# Patient Record
Sex: FEMALE | Race: BLACK OR AFRICAN AMERICAN | URBAN - METROPOLITAN AREA
[De-identification: names, ages, dates, MRNs, and addresses within clinical notes are randomized per-mention and may not be internally consistent; named-entity substitution may affect disease eponyms.]

---

## 2021-12-05 ENCOUNTER — CLAIMS CREATED FROM THE CLAIM WINDOW (OUTPATIENT)
Dept: URBAN - METROPOLITAN AREA MEDICAL CENTER 43 | Facility: MEDICAL CENTER | Age: 66
End: 2021-12-05
Payer: COMMERCIAL

## 2021-12-05 DIAGNOSIS — R74.8 ABNORMAL LEVELS OF OTHER SERUM ENZYMES: ICD-10-CM

## 2021-12-05 DIAGNOSIS — R79.89 ABNORMAL LIVER FUNCTION TESTS: ICD-10-CM

## 2021-12-05 DIAGNOSIS — C85.90 LYMPHOMA: ICD-10-CM

## 2021-12-05 DIAGNOSIS — R76.8 OTHER SPECIFIED ABNORMAL IMMUNOLOGICAL FINDINGS IN SERUM: ICD-10-CM

## 2021-12-05 DIAGNOSIS — R74.01 ELEVATION OF LEVELS OF LIVER TRANSAMINASE LEVELS: ICD-10-CM

## 2021-12-05 PROCEDURE — 99222 1ST HOSP IP/OBS MODERATE 55: CPT | Performed by: INTERNAL MEDICINE

## 2021-12-06 ENCOUNTER — CLAIMS CREATED FROM THE CLAIM WINDOW (OUTPATIENT)
Dept: URBAN - METROPOLITAN AREA MEDICAL CENTER 43 | Facility: MEDICAL CENTER | Age: 66
End: 2021-12-06
Payer: COMMERCIAL

## 2021-12-06 DIAGNOSIS — C85.90 NON-HODGKIN LYMPHOMA, UNSPECIFIED, UNSPECIFIED SITE: ICD-10-CM

## 2021-12-06 DIAGNOSIS — R74.01 ABNORMAL/ELEVATED TRANSAMINASE (SGOT, AMINOTRANSFERASE): ICD-10-CM

## 2021-12-06 DIAGNOSIS — R79.89 ABNORMAL C-REACTIVE PROTEIN: ICD-10-CM

## 2021-12-06 DIAGNOSIS — R74.8 ABNORMAL LEVELS OF OTHER SERUM ENZYMES: ICD-10-CM

## 2021-12-06 DIAGNOSIS — R76.8 AUTOANTIBODY TITER POSITIVE: ICD-10-CM

## 2021-12-06 PROCEDURE — 99233 SBSQ HOSP IP/OBS HIGH 50: CPT | Performed by: PHYSICIAN ASSISTANT

## 2021-12-07 ENCOUNTER — CLAIMS CREATED FROM THE CLAIM WINDOW (OUTPATIENT)
Dept: URBAN - METROPOLITAN AREA MEDICAL CENTER 43 | Facility: MEDICAL CENTER | Age: 66
End: 2021-12-07
Payer: COMMERCIAL

## 2021-12-07 DIAGNOSIS — R74.01 ELEVATED ALANINE AMINOTRANSFERASE (ALT) LEVEL: ICD-10-CM

## 2021-12-07 DIAGNOSIS — R74.8 ABNORMAL LEVELS OF OTHER SERUM ENZYMES: ICD-10-CM

## 2021-12-07 DIAGNOSIS — R79.89 ABNORMAL C-REACTIVE PROTEIN: ICD-10-CM

## 2021-12-07 DIAGNOSIS — C85.90 NON-HODGKIN LYMPHOMA, UNSPECIFIED, UNSPECIFIED SITE: ICD-10-CM

## 2021-12-07 DIAGNOSIS — R76.8 AUTOANTIBODY TITER ELEVATED: ICD-10-CM

## 2021-12-07 PROCEDURE — 99233 SBSQ HOSP IP/OBS HIGH 50: CPT | Performed by: PHYSICIAN ASSISTANT

## 2021-12-09 ENCOUNTER — TELEPHONE ENCOUNTER (OUTPATIENT)
Dept: URBAN - METROPOLITAN AREA CLINIC 113 | Facility: CLINIC | Age: 66
End: 2021-12-09

## 2021-12-14 LAB
A/G RATIO: 1.4
ALBUMIN: 3.4
ALKALINE PHOSPHATASE: 214
ALT (SGPT): 288
AST (SGOT): 463
BASO (ABSOLUTE): 0.1
BASOS: 1
BILIRUBIN, TOTAL: 24
BUN/CREATININE RATIO: 14
BUN: 18
CALCIUM: 9.8
CARBON DIOXIDE, TOTAL: 21
CHLORIDE: 95
CREATININE: 1.33
EGFR IF AFRICN AM: 48
EGFR IF NONAFRICN AM: 42
EOS (ABSOLUTE): 0
EOS: 0
GLOBULIN, TOTAL: 2.4
GLUCOSE: 93
HEMATOCRIT: 28.5
HEMATOLOGY COMMENTS:: (no result)
HEMOGLOBIN: 10.1
IMMATURE CELLS: (no result)
IMMATURE GRANS (ABS): 0.4
IMMATURE GRANULOCYTES: 4
INR: 2
LYMPHS (ABSOLUTE): 0.6
LYMPHS: 7
MCH: 27
MCHC: 35.4
MCV: 76
MONOCYTES(ABSOLUTE): 1.1
MONOCYTES: 12
NEUTROPHILS (ABSOLUTE): 7.2
NEUTROPHILS: 76
NRBC: (no result)
PLATELETS: 105
POTASSIUM: 4.6
PROTEIN, TOTAL: 5.8
PROTHROMBIN TIME: 20.2
RBC: 3.74
RDW: 17.5
SODIUM: 136
WBC: 9.5

## 2021-12-15 ENCOUNTER — TELEPHONE ENCOUNTER (OUTPATIENT)
Dept: URBAN - METROPOLITAN AREA CLINIC 113 | Facility: CLINIC | Age: 66
End: 2021-12-15

## 2021-12-17 ENCOUNTER — WEB ENCOUNTER (OUTPATIENT)
Dept: URBAN - METROPOLITAN AREA CLINIC 113 | Facility: CLINIC | Age: 66
End: 2021-12-17

## 2021-12-17 ENCOUNTER — OFFICE VISIT (OUTPATIENT)
Dept: URBAN - METROPOLITAN AREA CLINIC 113 | Facility: CLINIC | Age: 66
End: 2021-12-17
Payer: COMMERCIAL

## 2021-12-17 VITALS
HEIGHT: 60 IN | WEIGHT: 180 LBS | BODY MASS INDEX: 35.34 KG/M2 | TEMPERATURE: 97.8 F | DIASTOLIC BLOOD PRESSURE: 68 MMHG | HEART RATE: 103 BPM | SYSTOLIC BLOOD PRESSURE: 113 MMHG

## 2021-12-17 DIAGNOSIS — R74.8 ELEVATED LIVER ENZYMES: ICD-10-CM

## 2021-12-17 DIAGNOSIS — D68.9 COAGULOPATHY: ICD-10-CM

## 2021-12-17 DIAGNOSIS — E80.6 HYPERBILIRUBINEMIA: ICD-10-CM

## 2021-12-17 DIAGNOSIS — B16.9 ACUTE VIRAL HEPATITIS B WITHOUT COMA AND WITHOUT DELTA AGENT: ICD-10-CM

## 2021-12-17 PROCEDURE — 99214 OFFICE O/P EST MOD 30 MIN: CPT | Performed by: NURSE PRACTITIONER

## 2021-12-17 RX ORDER — POTASSIUM CHLORIDE 750 MG/1
1 TABLET WITH FOOD TABLET, EXTENDED RELEASE ORAL TWICE A DAY
Status: ACTIVE | COMMUNITY

## 2021-12-17 RX ORDER — FUROSEMIDE 20 MG/1
1 TABLET TABLET ORAL ONCE A DAY
Status: ACTIVE | COMMUNITY

## 2021-12-17 NOTE — PHYSICAL EXAM GASTROINTESTINAL
Abdomen , soft, nontender, nondistended , no guarding or rigidity , no masses palpable , normal bowel sounds , Liver and Spleen , no hepatosplenomegaly; exam limited due to patient position

## 2021-12-17 NOTE — HPI-TODAY'S VISIT:
This is a 66-year-old female with a history of metastatic T-cell non-Hodgkin's lymphoma, anemia, hypertension, obesity, and elevated liver enzymes presenting for hospital follow-up. She was seen in hospital consultation 12/5/2021 for liver enzyme elevation.  She was followed by Dr. Patino for non-Hodgkin's lymphoma and had undergone 6 cycles of CHOP chemotherapy.  She was noted to have liver involvement with non-Hodgkin's lymphoma.  Labs demonstrated positive hepatitis B surface antigen with the remainder of her hepatitis studies being normal.  it was discussed that  liver enzyme elevation was likely associated with progression of lymphoma.  Drug-induced liver injury associated with chemotherapy was also a consideration.  Hepatitis B PCR was pending.  Per verbal discussion yesterday with Dr. Rousseau, she had a liver biopsy showing changes of hepatitis.  He stated that her labs were likely associated with hepatitis B and would improve over time.  Drug-induced liver injury also remained a consideration.  He advised that if she was having symptoms, she needs to return to the emergency department and otherwise, labs were to be rechecked. She reports overall low energy and a feeling of weakness.  She denies heartburn, dysphagia, or abdominal pain.  She reports feeling distended at across her upper abdomen.  She continues to report lower extremity edema which has improved but is persistent.  She is having 2 or 3 bowel movements per day.  She denies diarrhea, red blood per rectum, or any other abdominal symptoms.  She reports her urine is less dark.  She has not had alcohol since her admission.  In the past, she drank a glass of wine infrequently.  She has been on home oxygen since she was discharged.  She is under the care of a pulmonologist.  She is unsure regarding the exact nature of her pulmonary problem.  Hepatitis B DNA during hospitalization demonstrated detected virus 1: 35,824.

## 2021-12-17 NOTE — PHYSICAL EXAM CARDIOVASCULAR:
1-2+ bilateral pedal edema; trace edema bilateral lower legs,  no murmurs,  regular rate and rhythm

## 2021-12-24 LAB
A/G RATIO: 1.3
ALBUMIN: 3.1
ALKALINE PHOSPHATASE: 218
ALT (SGPT): 104
AST (SGOT): 158
BILIRUBIN, TOTAL: 17.3
BUN/CREATININE RATIO: 29
BUN: 35
CALCIUM: 9.3
CARBON DIOXIDE, TOTAL: 22
CHLORIDE: 98
CREATININE: 1.22
EGFR IF AFRICN AM: 53
EGFR IF NONAFRICN AM: 46
GLOBULIN, TOTAL: 2.4
GLUCOSE: 111
INR: 1.8
POTASSIUM: 5
PROTEIN, TOTAL: 5.5
PROTHROMBIN TIME: 18.8
SODIUM: 133

## 2021-12-27 ENCOUNTER — TELEPHONE ENCOUNTER (OUTPATIENT)
Dept: URBAN - METROPOLITAN AREA CLINIC 113 | Facility: CLINIC | Age: 66
End: 2021-12-27

## 2021-12-29 ENCOUNTER — TELEPHONE ENCOUNTER (OUTPATIENT)
Dept: URBAN - METROPOLITAN AREA CLINIC 113 | Facility: CLINIC | Age: 66
End: 2021-12-29

## 2022-01-09 ENCOUNTER — TELEPHONE ENCOUNTER (OUTPATIENT)
Dept: URBAN - METROPOLITAN AREA CLINIC 113 | Facility: CLINIC | Age: 67
End: 2022-01-09

## 2022-01-19 ENCOUNTER — DASHBOARD ENCOUNTERS (OUTPATIENT)
Age: 67
End: 2022-01-19

## 2022-01-19 ENCOUNTER — OFFICE VISIT (OUTPATIENT)
Dept: URBAN - METROPOLITAN AREA CLINIC 113 | Facility: CLINIC | Age: 67
End: 2022-01-19
Payer: COMMERCIAL

## 2022-01-19 VITALS
TEMPERATURE: 97.1 F | DIASTOLIC BLOOD PRESSURE: 65 MMHG | HEART RATE: 98 BPM | RESPIRATION RATE: 22 BRPM | SYSTOLIC BLOOD PRESSURE: 98 MMHG | HEIGHT: 60 IN

## 2022-01-19 DIAGNOSIS — B16.9 ACUTE VIRAL HEPATITIS B WITHOUT COMA AND WITHOUT DELTA AGENT: ICD-10-CM

## 2022-01-19 DIAGNOSIS — R11.0 NAUSEA: ICD-10-CM

## 2022-01-19 DIAGNOSIS — R74.8 ELEVATED LIVER ENZYMES: ICD-10-CM

## 2022-01-19 DIAGNOSIS — E80.6 HYPERBILIRUBINEMIA: ICD-10-CM

## 2022-01-19 PROCEDURE — 99213 OFFICE O/P EST LOW 20 MIN: CPT | Performed by: NURSE PRACTITIONER

## 2022-01-19 RX ORDER — PROMETHAZINE HYDROCHLORIDE 12.5 MG/1
1 TABLET TABLET ORAL
Qty: 30 | Refills: 1 | OUTPATIENT

## 2022-01-19 RX ORDER — HYDROCHLOROTHIAZIDE 12.5 MG/1
1 TABLET IN THE MORNING TABLET ORAL ONCE A DAY
Status: ON HOLD | COMMUNITY

## 2022-01-19 RX ORDER — PANTOPRAZOLE SODIUM 40 MG/1
1 TABLET TABLET, DELAYED RELEASE ORAL ONCE A DAY
Status: ACTIVE | COMMUNITY

## 2022-01-19 RX ORDER — POTASSIUM CHLORIDE 750 MG/1
1 TABLET WITH FOOD TABLET, EXTENDED RELEASE ORAL TWICE A DAY
Status: ACTIVE | COMMUNITY

## 2022-01-19 RX ORDER — BUMETANIDE 1 MG/1
1 TABLET TABLET ORAL ONCE A DAY
Status: ACTIVE | COMMUNITY

## 2022-01-19 RX ORDER — SPIRONOLACTONE 25 MG/1
1 TABLET TABLET, FILM COATED ORAL
Status: ACTIVE | COMMUNITY

## 2022-01-19 RX ORDER — PANTOPRAZOLE SODIUM 40 MG/1
1 TABLET TABLET, DELAYED RELEASE ORAL TWICE DAILY
Qty: 60 | Refills: 5 | OUTPATIENT

## 2022-01-19 RX ORDER — FUROSEMIDE 20 MG/1
1 TABLET TABLET ORAL ONCE A DAY
Status: ON HOLD | COMMUNITY

## 2022-01-19 RX ORDER — DOCUSATE SODIUM 100 MG/1
1 CAPSULE AS NEEDED CAPSULE, LIQUID FILLED ORAL ONCE A DAY
Status: ACTIVE | COMMUNITY

## 2022-01-19 NOTE — HPI-TODAY'S VISIT:
This is a 66-year-old female with a history of metastatic T-cell non-Hodgkin's lymphoma, anemia, hypertension, obesity, and jaundice and elevated liver enzymes associated with acute hepatitis C presenting for short interval follow-up. She was last seen 12/17/2021 for hospital follow-up.  She had been seen in consultation for liver enzyme elevation.  She had completed 6 cycles of CHOP chemotherapy for non-Hodgkin's lymphoma and she was noted to have liver involvement with lymphoma.  Labs demonstrated acute hepatitis B.  Her transaminases were improving.  Her labs were reviewed with Dr. Costa who recommended repeat labs in 1 week.  She was to continue furosemide and potassium.  A low-sodium diet was recommended.  Labs demonstrated further improvement and repeat labs were recommended in 1 week.  She decided to wait until her appointment to have these labs performed.  She reports worsening lower extremity edema.  She is scheduled for a nephrology evaluation on 1/27.  She continues to follow with Dr. Patino.  She had a CT scan at [facility] yesterday.  He is planning 1 more chemotherapy treatment after her liver improves.  She states her urine is now a normal color.  She is taking pantoprazole 40 mg daily.  She has epigastric fullness and swelling after meals.  She is having postprandial nausea for which she is taking promethazine 12.5 mg requiring this once a day.  She is requesting a refill.  She denies abdominal pain, heartburn, regurgitation, or dysphagia.  She is having bowel movements every day.  Her stools were hard and are now soft on a daily stool softener.  She denies red blood per rectum or melena.  She is on oxygen 2 L continuously at home.

## 2022-01-19 NOTE — PHYSICAL EXAM CONSTITUTIONAL:
well developed, well nourished , in no acute distress; in a wheelchair due to decreased mobility secondary to edema

## 2022-01-19 NOTE — HPI-OTHER HISTORIES
Labs  12/27/2021:BMP normal with the exception of glucose 111, BUN 35, creatinine 1.22, sodium 133, protein 5.5, albumin 3.1.  LFTs: TB 17.3, , , .  PT 18.8, INR 1.8. 12/9/2021:BMP normal with exception of creatinine 1.33, chloride 95, total protein 5.8, albumin 3.4.  LFTs: TB 24, , , .  CBC: WBC 9.5, hemoglobin 10.1, MCV 76, platelet 105.  PT 20.2, INR 2.0.

## 2022-01-19 NOTE — PHYSICAL EXAM CARDIOVASCULAR:
2+ bilateral pedal edema; 2+ edema bilateral lower legs 1+ edema thighs,  no murmurs,  regular rate and rhythm

## 2022-01-20 LAB
A/G RATIO: 1
ALBUMIN: 2.9
ALKALINE PHOSPHATASE: 174
ALT (SGPT): 29
AST (SGOT): 65
BILIRUBIN, TOTAL: 7.6
BUN/CREATININE RATIO: 18
BUN: 20
CALCIUM: 9
CARBON DIOXIDE, TOTAL: 24
CHLORIDE: 100
CREATININE: 1.12
EGFR IF AFRICN AM: 59
EGFR IF NONAFRICN AM: 51
GLOBULIN, TOTAL: 2.9
GLUCOSE: 119
INR: 1.4
POTASSIUM: 4
PROTEIN, TOTAL: 5.8
PROTHROMBIN TIME: 14.5
SODIUM: 140

## 2022-01-21 ENCOUNTER — TELEPHONE ENCOUNTER (OUTPATIENT)
Dept: URBAN - METROPOLITAN AREA CLINIC 113 | Facility: CLINIC | Age: 67
End: 2022-01-21

## 2022-01-22 PROBLEM — 422587007: Status: ACTIVE | Noted: 2022-01-19

## 2022-01-22 PROBLEM — 64779008: Status: ACTIVE | Noted: 2021-12-18

## 2022-01-22 PROBLEM — 14783006: Status: ACTIVE | Noted: 2021-12-18

## 2022-01-22 PROBLEM — 76795007: Status: ACTIVE | Noted: 2021-12-17

## 2022-01-22 PROBLEM — 707724006: Status: ACTIVE | Noted: 2022-01-19

## 2022-02-04 ENCOUNTER — CLAIMS CREATED FROM THE CLAIM WINDOW (OUTPATIENT)
Dept: URBAN - METROPOLITAN AREA MEDICAL CENTER 43 | Facility: MEDICAL CENTER | Age: 67
End: 2022-02-04
Payer: COMMERCIAL

## 2022-02-04 DIAGNOSIS — K76.89 LIVER LESION: ICD-10-CM

## 2022-02-04 DIAGNOSIS — R74.8 ABNORMAL LEVELS OF OTHER SERUM ENZYMES: ICD-10-CM

## 2022-02-04 DIAGNOSIS — R74.01 ALT (SGPT) LEVEL RAISED: ICD-10-CM

## 2022-02-04 DIAGNOSIS — C85.90 NON HODGKIN'S LYMPHOMA: ICD-10-CM

## 2022-02-04 DIAGNOSIS — R79.89 ABNORMAL CBC: ICD-10-CM

## 2022-02-04 DIAGNOSIS — R18.8 OTHER ASCITES: ICD-10-CM

## 2022-02-04 DIAGNOSIS — B16.9 ACUTE HEPATITIS B WITHOUT DELTA-AGENT AND WITHOUT HEPATIC COMA: ICD-10-CM

## 2022-02-04 PROCEDURE — 99222 1ST HOSP IP/OBS MODERATE 55: CPT | Performed by: INTERNAL MEDICINE

## 2022-02-04 PROCEDURE — 99214 OFFICE O/P EST MOD 30 MIN: CPT | Performed by: INTERNAL MEDICINE

## 2022-02-05 ENCOUNTER — CLAIMS CREATED FROM THE CLAIM WINDOW (OUTPATIENT)
Dept: URBAN - METROPOLITAN AREA MEDICAL CENTER 43 | Facility: MEDICAL CENTER | Age: 67
End: 2022-02-05
Payer: COMMERCIAL

## 2022-02-05 DIAGNOSIS — R18.8 OTHER ASCITES: ICD-10-CM

## 2022-02-05 DIAGNOSIS — R74.8 ABNORMAL LEVELS OF OTHER SERUM ENZYMES: ICD-10-CM

## 2022-02-05 DIAGNOSIS — R79.89 ABNORMAL LFTS (LIVER FUNCTION TESTS): ICD-10-CM

## 2022-02-05 DIAGNOSIS — B16.9 ACUTE HEPATITIS B WITHOUT DELTA-AGENT AND WITHOUT HEPATIC COMA: ICD-10-CM

## 2022-02-05 DIAGNOSIS — R74.01 ELEVATED ALANINE AMINOTRANSFERASE (ALT) LEVEL: ICD-10-CM

## 2022-02-05 PROCEDURE — 99232 SBSQ HOSP IP/OBS MODERATE 35: CPT | Performed by: INTERNAL MEDICINE

## 2022-02-05 PROCEDURE — 99213 OFFICE O/P EST LOW 20 MIN: CPT | Performed by: INTERNAL MEDICINE

## 2022-02-06 ENCOUNTER — CLAIMS CREATED FROM THE CLAIM WINDOW (OUTPATIENT)
Dept: URBAN - METROPOLITAN AREA MEDICAL CENTER 43 | Facility: MEDICAL CENTER | Age: 67
End: 2022-02-06
Payer: COMMERCIAL

## 2022-02-06 DIAGNOSIS — R79.89 ABNORMAL CBC: ICD-10-CM

## 2022-02-06 DIAGNOSIS — B16.9 ACUTE HEPATITIS B WITHOUT DELTA-AGENT AND WITHOUT HEPATIC COMA: ICD-10-CM

## 2022-02-06 DIAGNOSIS — R74.01 ALT (SGPT) LEVEL RAISED: ICD-10-CM

## 2022-02-06 DIAGNOSIS — R18.8 OTHER ASCITES: ICD-10-CM

## 2022-02-06 DIAGNOSIS — R74.8 ABNORMAL LEVELS OF OTHER SERUM ENZYMES: ICD-10-CM

## 2022-02-06 PROCEDURE — 99213 OFFICE O/P EST LOW 20 MIN: CPT | Performed by: INTERNAL MEDICINE

## 2022-02-06 PROCEDURE — 99232 SBSQ HOSP IP/OBS MODERATE 35: CPT | Performed by: INTERNAL MEDICINE

## 2022-02-07 ENCOUNTER — TELEPHONE ENCOUNTER (OUTPATIENT)
Dept: URBAN - METROPOLITAN AREA CLINIC 113 | Facility: CLINIC | Age: 67
End: 2022-02-07

## 2022-02-07 ENCOUNTER — CLAIMS CREATED FROM THE CLAIM WINDOW (OUTPATIENT)
Dept: URBAN - METROPOLITAN AREA MEDICAL CENTER 43 | Facility: MEDICAL CENTER | Age: 67
End: 2022-02-07
Payer: COMMERCIAL

## 2022-02-07 DIAGNOSIS — R79.89 ABNORMAL CBC: ICD-10-CM

## 2022-02-07 DIAGNOSIS — R74.01 ELEVATED ALT MEASUREMENT: ICD-10-CM

## 2022-02-07 DIAGNOSIS — R74.8 ABNORMAL LEVELS OF OTHER SERUM ENZYMES: ICD-10-CM

## 2022-02-07 DIAGNOSIS — B16.9 ACUTE HEPATITIS B WITHOUT DELTA-AGENT AND WITHOUT HEPATIC COMA: ICD-10-CM

## 2022-02-07 DIAGNOSIS — C85.90: ICD-10-CM

## 2022-02-07 DIAGNOSIS — R18.8 OTHER ASCITES: ICD-10-CM

## 2022-02-07 PROCEDURE — 99213 OFFICE O/P EST LOW 20 MIN: CPT | Performed by: INTERNAL MEDICINE

## 2022-02-07 PROCEDURE — 99232 SBSQ HOSP IP/OBS MODERATE 35: CPT | Performed by: INTERNAL MEDICINE

## 2022-02-07 RX ORDER — TENOFOVIR ALAFENAMIDE 25 MG/1
1 TABLET WITH FOOD TABLET ORAL ONCE A DAY
Qty: 90 TABLET | Refills: 3 | OUTPATIENT

## 2022-03-13 ENCOUNTER — CLAIMS CREATED FROM THE CLAIM WINDOW (OUTPATIENT)
Dept: URBAN - METROPOLITAN AREA MEDICAL CENTER 43 | Facility: MEDICAL CENTER | Age: 67
End: 2022-03-13
Payer: COMMERCIAL

## 2022-03-13 DIAGNOSIS — R18.8 OTHER ASCITES: ICD-10-CM

## 2022-03-13 DIAGNOSIS — R60.0 BILATERAL EDEMA OF LOWER EXTREMITY: ICD-10-CM

## 2022-03-13 DIAGNOSIS — K74.69 CIRRHOSIS, CRYPTOGENIC: ICD-10-CM

## 2022-03-13 DIAGNOSIS — B18.1 CHRONIC VIRAL HEPATITIS B WITHOUT DELTA-AGENT: ICD-10-CM

## 2022-03-13 DIAGNOSIS — C85.90 LYMPHOMA: ICD-10-CM

## 2022-03-13 PROCEDURE — 99203 OFFICE O/P NEW LOW 30 MIN: CPT | Performed by: INTERNAL MEDICINE

## 2022-03-13 PROCEDURE — 99222 1ST HOSP IP/OBS MODERATE 55: CPT | Performed by: INTERNAL MEDICINE

## 2022-03-14 ENCOUNTER — CLAIMS CREATED FROM THE CLAIM WINDOW (OUTPATIENT)
Dept: URBAN - METROPOLITAN AREA MEDICAL CENTER 43 | Facility: MEDICAL CENTER | Age: 67
End: 2022-03-14
Payer: COMMERCIAL

## 2022-03-14 DIAGNOSIS — B18.1 CHRONIC VIRAL HEPATITIS B WITHOUT DELTA-AGENT: ICD-10-CM

## 2022-03-14 DIAGNOSIS — K74.69 CIRRHOSIS, CRYPTOGENIC - CHILD A - RECENT FALLS POSSIBLE MILD HEPATIC ENCEPHALOPATHY: ICD-10-CM

## 2022-03-14 DIAGNOSIS — R18.8 OTHER ASCITES: ICD-10-CM

## 2022-03-14 PROCEDURE — 99232 SBSQ HOSP IP/OBS MODERATE 35: CPT | Performed by: INTERNAL MEDICINE

## 2022-03-14 PROCEDURE — 99213 OFFICE O/P EST LOW 20 MIN: CPT | Performed by: INTERNAL MEDICINE

## 2022-03-15 ENCOUNTER — CLAIMS CREATED FROM THE CLAIM WINDOW (OUTPATIENT)
Dept: URBAN - METROPOLITAN AREA MEDICAL CENTER 43 | Facility: MEDICAL CENTER | Age: 67
End: 2022-03-15
Payer: COMMERCIAL

## 2022-03-15 DIAGNOSIS — R18.8 OTHER ASCITES: ICD-10-CM

## 2022-03-15 DIAGNOSIS — D64.89 OTHER SPECIFIED ANEMIAS: ICD-10-CM

## 2022-03-15 DIAGNOSIS — K74.69 CIRRHOSIS, CRYPTOGENIC - LIKELY NASH: ICD-10-CM

## 2022-03-15 DIAGNOSIS — B18.1 CHRONIC VIRAL HEPATITIS B WITHOUT DELTA-AGENT: ICD-10-CM

## 2022-03-15 PROCEDURE — 99232 SBSQ HOSP IP/OBS MODERATE 35: CPT | Performed by: INTERNAL MEDICINE

## 2022-03-15 PROCEDURE — 99213 OFFICE O/P EST LOW 20 MIN: CPT | Performed by: INTERNAL MEDICINE

## 2022-03-16 ENCOUNTER — CLAIMS CREATED FROM THE CLAIM WINDOW (OUTPATIENT)
Dept: URBAN - METROPOLITAN AREA MEDICAL CENTER 43 | Facility: MEDICAL CENTER | Age: 67
End: 2022-03-16
Payer: COMMERCIAL

## 2022-03-16 DIAGNOSIS — K80.20 CHOLELITHIASIS: ICD-10-CM

## 2022-03-16 DIAGNOSIS — K74.69 OTHER CIRRHOSIS OF LIVER: ICD-10-CM

## 2022-03-16 DIAGNOSIS — B18.1 CHRONIC VIRAL HEPATITIS B WITHOUT DELTA-AGENT: ICD-10-CM

## 2022-03-16 DIAGNOSIS — D64.89 OTHER SPECIFIED ANEMIAS: ICD-10-CM

## 2022-03-16 DIAGNOSIS — R18.8 OTHER ASCITES: ICD-10-CM

## 2022-03-16 DIAGNOSIS — R60.1 ANASARCA: ICD-10-CM

## 2022-03-16 PROCEDURE — 99213 OFFICE O/P EST LOW 20 MIN: CPT | Performed by: INTERNAL MEDICINE

## 2022-03-16 PROCEDURE — 99232 SBSQ HOSP IP/OBS MODERATE 35: CPT | Performed by: INTERNAL MEDICINE

## 2022-03-17 ENCOUNTER — CLAIMS CREATED FROM THE CLAIM WINDOW (OUTPATIENT)
Dept: URBAN - METROPOLITAN AREA MEDICAL CENTER 43 | Facility: MEDICAL CENTER | Age: 67
End: 2022-03-17
Payer: COMMERCIAL

## 2022-03-17 ENCOUNTER — OFFICE VISIT (OUTPATIENT)
Dept: URBAN - METROPOLITAN AREA CLINIC 113 | Facility: CLINIC | Age: 67
End: 2022-03-17

## 2022-03-17 DIAGNOSIS — K74.69 CIRRHOSIS, CRYPTOGENIC - CHILD A - RECENT FALLS POSSIBLE MILD HEPATIC ENCEPHALOPATHY: ICD-10-CM

## 2022-03-17 DIAGNOSIS — B18.1 CHRONIC VIRAL HEPATITIS B WITHOUT DELTA-AGENT: ICD-10-CM

## 2022-03-17 DIAGNOSIS — R60.1 ANASARCA: ICD-10-CM

## 2022-03-17 DIAGNOSIS — R18.8 OTHER ASCITES: ICD-10-CM

## 2022-03-17 PROCEDURE — 99213 OFFICE O/P EST LOW 20 MIN: CPT | Performed by: INTERNAL MEDICINE

## 2022-03-17 PROCEDURE — 99232 SBSQ HOSP IP/OBS MODERATE 35: CPT | Performed by: INTERNAL MEDICINE

## 2022-03-18 ENCOUNTER — CLAIMS CREATED FROM THE CLAIM WINDOW (OUTPATIENT)
Dept: URBAN - METROPOLITAN AREA MEDICAL CENTER 43 | Facility: MEDICAL CENTER | Age: 67
End: 2022-03-18
Payer: COMMERCIAL

## 2022-03-18 DIAGNOSIS — K74.69 OTHER CIRRHOSIS OF LIVER: ICD-10-CM

## 2022-03-18 DIAGNOSIS — B18.1 CHRONIC VIRAL HEPATITIS B WITHOUT DELTA-AGENT: ICD-10-CM

## 2022-03-18 DIAGNOSIS — R18.8 OTHER ASCITES: ICD-10-CM

## 2022-03-18 DIAGNOSIS — I95.9 HYPOTENSION, UNSPECIFIED: ICD-10-CM

## 2022-03-18 DIAGNOSIS — D64.89 OTHER SPECIFIED ANEMIAS: ICD-10-CM

## 2022-03-18 PROCEDURE — 99213 OFFICE O/P EST LOW 20 MIN: CPT | Performed by: INTERNAL MEDICINE

## 2022-03-18 PROCEDURE — 99232 SBSQ HOSP IP/OBS MODERATE 35: CPT | Performed by: INTERNAL MEDICINE

## 2022-03-21 ENCOUNTER — OFFICE VISIT (OUTPATIENT)
Dept: URBAN - METROPOLITAN AREA CLINIC 113 | Facility: CLINIC | Age: 67
End: 2022-03-21